# Patient Record
(demographics unavailable — no encounter records)

---

## 2024-10-10 NOTE — PROCEDURE
[FreeTextEntry1] : Prior exams reviewed:  priti: mixed restricted/obstructive pattern, slight improvement from prior  exercise oximetry: no significant O2 desat with 6 min on room air.  EXAM: [id number] - CT CHEST  - ORDERED BY: MALATHI THOMPSON   PROCEDURE DATE:  06/10/2024    INTERPRETATION:  Clinical information: Follow-up examination. Exam is compared to previous study of 5/12/2023.  CT scan of the chest was obtained without administration of intravenous contrast.  Elliptical shaped low attenuation collection in the left breast is unchanged when compared to previous exam. Thickening of the skin overlying the left breast is also noted. It is unchanged when compared to previous exam.  Few small lymph nodes are present in the pretracheal space and AP window.  Heart is normal in size. Calcification of the coronary arteries is noted. No pericardial effusion is noted.  No endobronchial lesions are noted. Previously noted nodular opacities in both upper lobes are no longer present. Postradiation therapy changes are present in the peripheral aspect of the left upper lobe just underneath the left anterior chest wall. Linear opacity likely representing scarring noted in the right middle lobe is unchanged when compared to previous exam. No pleural effusions are noted.  Small hiatal hernia is noted.  Below the diaphragm, visualized portions of the abdomen demonstrate patient to be status post cholecystectomy. Anterior abdominal wall hernia containing fluid is noted. Thickening of both adrenal glands is noted.  Degenerative changes of the spine are noted.  IMPRESSION: Previously noted nodular opacities in both upper lobes are no longer present and have resolved.  --- End of Report ---       BASIA SCOTT MD; Attending Radiologist This document has been electronically signed. Jun 18 2024  4:  Reviewed:   priti: severe obstruction   ACC: [id number] EXAM: CT CHEST ORDERED BY: YUAN LI  PROCEDURE DATE: 05/12/2023    INTERPRETATION: CLINICAL INFORMATION: Shortness of breath and cough.  COMPARISON: CT angiogram chest 5/9/2023.  CONTRAST/COMPLICATIONS: IV Contrast: NONE Oral Contrast: NONE Complications: None reported at time of study completion  PROCEDURE: CT of the Chest was performed. Sagittal and coronal reformats were performed.  FINDINGS:  LUNGS AND AIRWAYS: PLEURA: There is a small left-sided pleural effusion, slightly increased in size. Associated compressive atelectasis at the left lung base. There is trace pleural fluid right costophrenic angle and associated compressive atelectasis.   There has been some interval improvement in the scattered patchy, peribronchial nodular opacities with persistent multifocal areas of micronodular tree-in-bud branching, nodular opacities likely reflecting sequelae of infectious/inflammatory small airway disease. There are few new small nodular airspace opacities, for example posterior right upper lobe (3:52) and medial left upper lobe (3:33).  Persistent pleural-parenchymal opacification anterior left upper lobe, likely reflects sequelae of scarring/fibrosis.  The central airways are patent.  MEDIASTINUM AND DIO: Small subcentimeter prevascular, pretracheal and subcarinal mediastinal lymph nodes.  VESSELS: Atherosclerotic changes thoracic aorta with ectasia ascending aorta measuring 3.9 cm. Coronary artery calcification.  HEART: The heart is mildly enlarged. No pericardial effusion.  CHEST WALL AND LOWER NECK: Postsurgical changes left breast with small fluid collection, stable.  VISUALIZED UPPER ABDOMEN: Cholecystectomy.  BONES: Degenerative changes.  IMPRESSION:  Small left-sided pleural effusion, slightly increased in size. Trace right pleural effusion.  Some interval improvement in the multilobar patchy peribronchial opacities with persistent micronodular opacities likely reflecting small airway disease.  New, small focal areas of nodular opacities in the periphery of the posterior right upper and medial left upper lobes as discussed. Recommend short interval follow-up CT scan in the chest in 3 months.  Postoperative changes left breast with stable appearance of small fluid collection.  Other findings as discussed above.  --- End of Report --   BLAISE JIN MD; Attending Radiologist This document has been electronically signed. May 12 2023 10:03AM